# Patient Record
Sex: FEMALE | Race: OTHER | HISPANIC OR LATINO | URBAN - METROPOLITAN AREA
[De-identification: names, ages, dates, MRNs, and addresses within clinical notes are randomized per-mention and may not be internally consistent; named-entity substitution may affect disease eponyms.]

---

## 2023-07-11 ENCOUNTER — EMERGENCY (EMERGENCY)
Facility: HOSPITAL | Age: 48
LOS: 1 days | Discharge: HOME | End: 2023-07-11
Attending: STUDENT IN AN ORGANIZED HEALTH CARE EDUCATION/TRAINING PROGRAM
Payer: MEDICAID

## 2023-07-11 VITALS
DIASTOLIC BLOOD PRESSURE: 78 MMHG | OXYGEN SATURATION: 100 % | SYSTOLIC BLOOD PRESSURE: 131 MMHG | RESPIRATION RATE: 16 BRPM | WEIGHT: 166.01 LBS | HEART RATE: 71 BPM | TEMPERATURE: 98 F

## 2023-07-11 PROCEDURE — 73564 X-RAY EXAM KNEE 4 OR MORE: CPT

## 2023-07-11 PROCEDURE — 99283 EMERGENCY DEPT VISIT LOW MDM: CPT

## 2023-07-11 PROCEDURE — 99284 EMERGENCY DEPT VISIT MOD MDM: CPT

## 2023-07-11 PROCEDURE — 73564 X-RAY EXAM KNEE 4 OR MORE: CPT | Mod: 26,RT

## 2023-07-11 RX ORDER — IBUPROFEN 200 MG
600 TABLET ORAL ONCE
Refills: 0 | Status: COMPLETED | OUTPATIENT
Start: 2023-07-11 | End: 2023-07-11

## 2023-07-11 RX ORDER — ACETAMINOPHEN 500 MG
650 TABLET ORAL ONCE
Refills: 0 | Status: COMPLETED | OUTPATIENT
Start: 2023-07-11 | End: 2023-07-11

## 2023-07-11 RX ADMIN — Medication 650 MILLIGRAM(S): at 22:13

## 2023-07-11 RX ADMIN — Medication 600 MILLIGRAM(S): at 22:13

## 2023-07-11 NOTE — ED ADULT NURSE NOTE - NSFALLUNIVINTERV_ED_ALL_ED
Bed/Stretcher in lowest position, wheels locked, appropriate side rails in place/Call bell, personal items and telephone in reach/Instruct patient to call for assistance before getting out of bed/chair/stretcher/Non-slip footwear applied when patient is off stretcher/[place] to call system/Physically safe environment - no spills, clutter or unnecessary equipment/Purposeful proactive rounding/Room/bathroom lighting operational, light cord in reach

## 2023-07-11 NOTE — ED PROVIDER NOTE - NS ED ATTENDING STATEMENT MOD
This was a shared visit with the BANG. I reviewed and verified the documentation and independently performed the documented:

## 2023-07-11 NOTE — ED PROVIDER NOTE - PHYSICAL EXAMINATION
Gen: Nontoxic, well appearing, in NAD.  Skin: Warm and dry as visualized.  Head: NC/AT.  Eyes: PERRLA. EOMI.  Neck: Supple, FROM. Trachea midline.   Resp: No distress.  Cardio: Well perfused.  Ext: No deformities. No knee effusion. MAEx4. FROM.   Neuro: A&Ox3. Appears nonfocal.   Psych: Normal affect and mood.

## 2023-07-11 NOTE — ED PROVIDER NOTE - OBJECTIVE STATEMENT
48 yo female presents to ED c/o right knee pain x4 days. Pain worse with kneeling. Did not self medicate PTA. No further complaints at this time. 46 yo female presents to ED c/o right knee pain x4 days. Pain worse with kneeling. Did not self medicate PTA. No further complaints at this time.  Denies trauma.

## 2023-07-11 NOTE — ED PROVIDER NOTE - PATIENT PORTAL LINK FT
You can access the FollowMyHealth Patient Portal offered by [facility] by registering at the following website: [email or web address]. By joining [facility]’s FollowMyHealth portal, you will also be able to view your health information using other applications (apps) compatible with our system.

## 2023-07-11 NOTE — ED PROVIDER NOTE - ATTENDING APP SHARED VISIT CONTRIBUTION OF CARE
47F presenting for eval of knee injury, 4 days of sx, worse with flexion, no trauma, no deformity, possible arthritic changes, check xr, treat sx, outpt follow up

## 2023-07-11 NOTE — ED PROVIDER NOTE - CLINICAL SUMMARY MEDICAL DECISION MAKING FREE TEXT BOX
48 yo female presents to ED c/o right knee pain x4 days, worse with kneeling. XR negative. Pain control. Ortho follow up. Medically stable for discharge.

## 2023-07-11 NOTE — ED PROVIDER NOTE - CARE PROVIDER_API CALL
Filemon Leon  Orthopaedic Surgery  [address] [place] [address]  Phone: [phone]  Fax: [phone]  Follow Up Time:

## 2023-07-11 NOTE — ED PROVIDER NOTE - NSFOLLOWUPINSTRUCTIONS_ED_ALL_ED_FT
- Ibuprofen 600mg every 6 hours as needed for pain.  - Acetaminophen 650mg every 6 hours as needed for pain.   - Please bring all documentation from your ED visit to any related future follow up appointment.  - Please call to schedule follow up appointment with your primary care physician within 24-48 hours.  - Please seek immediate medical attention or return to the ED for any new/worsening, signs/symptoms, or concerns.    Feel better!     - Ibuprofeno 600mg cada 6 horas según necesidad para el dolor.  - Acetaminofén 650 mg cada 6 horas según sea necesario para el dolor.  - Traiga toda la documentación de lema visita al ED a cualquier tato de seguimiento futura relacionada.  - Llame para programar niya tato de seguimiento con lema médico de atención primaria dentro de las 24 a 48 horas.  - Busque atención médica inmediata o regrese al servicio de urgencias por cualquier signo/síntoma o inquietud nuevos/empeorados.    ¡Sentirse mejor!    Dolor pauline de rodilla en los adultos  Acute Knee Pain, Adult    El dolor pauline de rodilla es repentino y puede deberse a daño, hinchazón o irritación de los músculos y tejidos que sostienen la rodilla. La lesión puede ser el resultado de:    Niya caída.  Niya lesión en la rodilla debido a movimientos de rotación.  Un golpe en la rodilla.  Niya infección.    El dolor pauline de rodilla puede desaparecer solo con el tiempo y el descanso. De no ser así, lema médico podría indicarle que se kody estudios para hallar la causa del dolor. Estos medicamentos pueden incluir:    Estudios de diagnóstico por imágenes, aureliano niya radiografía, niya resonancia magnética (RM) o niya ecografía.  Aspiración de niya articulación. En yee estudio, se extrae líquido de la rodilla.  Artroscopia. En yee estudio, se introduce un tubo iluminado en la rodilla y se proyecta niya imagen en niya pantalla de televisión.  Biopsia. En yee estudio, se maddy niya muestra de tejido del organismo y se la estudia con un microscopio.    Siga estas indicaciones en lema casa:  Esté atento a cualquier cambio en los síntomas. [place] estas medidas para aliviar el dolor.        Si tiene niya rodillera o un dispositivo ortopédico:     Use la rodillera o el dispositivo ortopédico aureliano se lo haya indicado el médico. Quíteselas solamente aureliano se lo haya indicado el médico.  Afloje la rodillera o el dispositivo ortopédico si los dedos de los pies se le entumecen, siente hormigueos o se le enfrían y se tornan de color chivo.  Mantenga la rodillera o el dispositivo ortopédico limpio.  Si la rodillera o el dispositivo ortopédico no es impermeable:    No deje que se mojen.  Cúbralo con un envoltorio hermético cuando tome un baño de inmersión o niya ducha.        Actividad    Descanse la rodilla.  No kody cosas que le causen dolor o que lo intensifiquen.  Evite las actividades o los ejercicios de alto impacto, aureliano correr, saltar la soga o hacer elzbieta de tijera.  Trabaje con un fisioterapeuta para crear un programa de ejercicios seguros, según lo recomiende el médico. Kody ejercicios aureliano se lo haya indicado el fisioterapeuta.        Control del dolor, el entumecimiento y la hinchazón     Si se lo indican, aplique hielo sobre la rodilla:    Ponga el hielo en niya bolsa plástica.  Coloque niya toalla entre la piel y la bolsa.  Coloque el hielo greg 20 minutos, 2 a 3 veces por día.  Si se lo indican, use niya venda elástica para ejercer presión (compresión) en la rodilla lesionada. [place] puede controlar la hinchazón, darle sostén y ayudar a aliviar las molestias.        Indicaciones generales    [place] los medicamentos de venta [place] y los recetados solamente aureliano se lo haya indicado el médico.  Cuando esté sentado o acostado, levante (eleve) la rodilla por encima del nivel del corazón.  Duerma con niya almohada debajo de la rodilla.  No consuma ningún producto que contenga nicotina o tabaco, aureliano cigarrillos, cigarrillos electrónicos y tabaco de mascar. Estos pueden retrasar la recuperación. Si necesita ayuda para dejar de consumir, consulte al médico.  Si tiene sobrepeso, trabaje con el médico y un nutricionista para establecer niya meta de pérdida de peso que sea saludable y razonable para usted. El exceso de peso puede generar presión en la rodilla.  Concurra a todas las visitas de control aureliano se lo haya indicado el médico. [place] es importante.    Comuníquese con un médico si:  El dolor de rodilla continúa, cambia o empeora.  Tiene fiebre junto con dolor de rodilla.  La rodilla está caliente al tacto.  La rodilla se le tuerce o se le traba.    Solicite ayuda inmediatamente si:  La rodilla se hincha, y la hinchazón empeora.  No puede  la rodilla.  Siente un dolor intenso en la rodilla.    Resumen  El dolor pauline de rodilla puede deberse a niya caída, niya lesión, niya infección o a daño, hinchazón o irritación de los tejidos que sostienen la rodilla.  El médico podría hacerle estudios para hallar la causa del dolor.  Esté atento a cualquier cambio en los síntomas. Alivie el dolor con descanso, medicamentos, actividad de poca intensidad y el uso de hielo.  Pida ayuda si el dolor continúa o empeora, la rodilla se le hincha, o no puede  la rodilla.    NOTAS ADICIONALES E INSTRUCCIONES    Please follow up with your Primary MD in 24-48 hr.  Seek immediate medical care for any new/worsening signs or symptoms.

## 2023-10-05 NOTE — ED PROVIDER NOTE - TEMPLATE, MLM
I recommend DTap(whooping cough)  I have changed you to hydrochlorothiazide no longer on triamterene HCTZ  I have prescribed a steroid for your knee; apply ice to the knee  Ask your cousin to assist with ordering a tighter/smaller  knee sleeve  (copper fit)  See me in [facility]